# Patient Record
(demographics unavailable — no encounter records)

---

## 2024-11-04 NOTE — PHYSICAL EXAM
[Normal Mood and Affect] : normal mood and affect [Able to Communicate] : able to communicate [Well Developed] : well developed [Well Nourished] : well nourished [NL (0-90)] : full external rotation 0-90 degrees [Left] : left shoulder [There are no fractures, subluxations or dislocations. No significant abnormalities are seen] : There are no fractures, subluxations or dislocations. No significant abnormalities are seen [No bony abnormalities] : No bony abnormalities [] : no AC joint tenderness [FreeTextEntry9] : IR limited to L1 compared to rt at T9 [de-identified] : + Stefano [TWNoteComboBox4] : passive forward flexion 165 degrees

## 2024-11-04 NOTE — HISTORY OF PRESENT ILLNESS
[6] : 6 [1] : 2 [Sharp] : sharp [Tingling] : tingling [Intermittent] : intermittent [Nothing helps with pain getting better] : Nothing helps with pain getting better [de-identified] : 11/4/24  Initial visit for this 41 year old male RHD, hx of AODM, complaining of spontaneous onset of intermittent lt shoulder pain and stiffness x last few years duration. Gotten worse over last 1 year. Worse with overhead activity and reaching behind his back. Can lie on lt side at night. No wake up pain at night. Taking no nsaids.  PMH: Previous hx of neck pain x 3 years ago, went for PT which helped.  [] : no [FreeTextEntry1] : RT shoulder [de-identified] : reaching overhead [de-identified] : x-rays

## 2024-11-04 NOTE — REASON FOR VISIT
[FreeTextEntry2] : NP--LT shoulder. Shoulder pain for about two years that has gotten more severe over time. Numbness and tingling in the middle and pointer fingers of left hand. Noticeable weakness in the left arm. Treated by Dr. Toscano for previous neck issues back in 2021. Did PT and saw a chiropractor before, but didn't provide much relief.

## 2024-11-04 NOTE — PLAN
[TextEntry] : The patient was advised of the diagnosis. The natural history of the pathology was explained in full to the patient in layman's terms. All questions were answered. The risks and benefits of surgical and non-surgical treatment alternatives were explained in full to the patient.  Medication was injected into the above treated area. After verbal consent using sterile preparation and technique. The risks, benefits, and alternatives to cortisone injection were explained in full to the patient. Risks outlined include but are not limited to infection, sepsis, bleeding, scarring, skin discoloration, temporary increase in pain, syncopal episode, failure to resolve symptoms, allergic reaction, symptom recurrence, and elevation of blood sugar in diabetics. Patient understood the risks. All questions were answered. After discussion of options, patient requested an injection. Oral informed consent was obtained and sterile prep was done of the injection site. Sterile technique was utilized for the procedure including the preparation of the solutions used for the injection. Patient tolerated the procedure well. Advised to ice the injection site this evening. Prep with Betadine locally to site. Sterile technique used. Patient tolerated procedure well. Post Procedure Instructions: Patient was advised to call if redness, pain, or fever occur and apply ice for 15 min. out of every hour for the next 12-24 hours as tolerated. The patient was instructed on the importance of ice and elevation of the extremity to decrease swelling and pain.  Patient is being referred for physical therapy for various modalities.  If no improvement, consider MRI.

## 2024-12-23 NOTE — PHYSICAL EXAM
[Normal Mood and Affect] : normal mood and affect [Able to Communicate] : able to communicate [Well Developed] : well developed [Well Nourished] : well nourished [No bony abnormalities] : No bony abnormalities [NL (0-90)] : full external rotation 0-90 degrees [Left] : left shoulder [There are no fractures, subluxations or dislocations. No significant abnormalities are seen] : There are no fractures, subluxations or dislocations. No significant abnormalities are seen [TWNoteComboBox4] : passive forward flexion 165 degrees [NL (0-180)] : full passive forward flexion 0-180 degrees [] : negative Doherty [FreeTextEntry9] : IR limited to T9 bilaterally [de-identified] : - Stefano

## 2024-12-23 NOTE — PHYSICAL EXAM
[Normal Mood and Affect] : normal mood and affect [Able to Communicate] : able to communicate [Well Developed] : well developed [Well Nourished] : well nourished [No bony abnormalities] : No bony abnormalities [NL (0-90)] : full external rotation 0-90 degrees [Left] : left shoulder [There are no fractures, subluxations or dislocations. No significant abnormalities are seen] : There are no fractures, subluxations or dislocations. No significant abnormalities are seen [TWNoteComboBox4] : passive forward flexion 165 degrees [NL (0-180)] : full passive forward flexion 0-180 degrees [] : negative Doherty [FreeTextEntry9] : IR limited to T9 bilaterally [de-identified] : - Stefano

## 2024-12-23 NOTE — PLAN
[TextEntry] : The patient was advised of the diagnosis. The natural history of the pathology was explained in full to the patient in layman's terms. All questions were answered. The risks and benefits of surgical and non-surgical treatment alternatives were explained in full to the patient.  Patient was advised to continue with physical therapy and the transition to [facility].

## 2024-12-23 NOTE — HISTORY OF PRESENT ILLNESS
[6] : 6 [Sharp] : sharp [Tingling] : tingling [Intermittent] : intermittent [Nothing helps with pain getting better] : Nothing helps with pain getting better [1] : 2 [0] : 0 [de-identified] : 12/23/24:  Returns today feeling 60-70% better. Had cortisone injection x 6 weeks ago.In PT 2x/week which has really helped.  11/4/24  Initial visit for this 41 year old male RHD, hx of AODM, complaining of spontaneous onset of intermittent lt shoulder pain and stiffness x last few years duration. Gotten worse over last 1 year. Worse with overhead activity and reaching behind his back. Can lie on lt side at night. No wake up pain at night. Taking no nsaids.  PMH: Previous hx of neck pain x 3 years ago, went for PT which helped.  [] : no [FreeTextEntry1] : RT shoulder [FreeTextEntry5] : CSI last visit with good relief. Continuing PT, noticing improvement. [de-identified] : reaching overhead [de-identified] : x-rays

## 2024-12-23 NOTE — PLAN
[TextEntry] : The patient was advised of the diagnosis. The natural history of the pathology was explained in full to the patient in layman's terms. All questions were answered. The risks and benefits of surgical and non-surgical treatment alternatives were explained in full to the patient.  Patient was advised to continue with physical therapy and the transition to [facility].

## 2024-12-23 NOTE — HISTORY OF PRESENT ILLNESS
[6] : 6 [Sharp] : sharp [Tingling] : tingling [Intermittent] : intermittent [Nothing helps with pain getting better] : Nothing helps with pain getting better [1] : 2 [0] : 0 [de-identified] : 12/23/24:  Returns today feeling 60-70% better. Had cortisone injection x 6 weeks ago.In PT 2x/week which has really helped.  11/4/24  Initial visit for this 41 year old male RHD, hx of AODM, complaining of spontaneous onset of intermittent lt shoulder pain and stiffness x last few years duration. Gotten worse over last 1 year. Worse with overhead activity and reaching behind his back. Can lie on lt side at night. No wake up pain at night. Taking no nsaids.  PMH: Previous hx of neck pain x 3 years ago, went for PT which helped.  [] : no [FreeTextEntry1] : RT shoulder [FreeTextEntry5] : CSI last visit with good relief. Continuing PT, noticing improvement. [de-identified] : reaching overhead [de-identified] : x-rays